# Patient Record
(demographics unavailable — no encounter records)

---

## 2025-07-22 NOTE — HISTORY OF PRESENT ILLNESS
[FreeTextEntry1] : 55 yo female s/p LSH BS here today for annual visit. C/o VMS, wakes up sweating. The patient has no current GYN complaints and denies any vaginal bleeding. No changes to bladder or bowel pattern. The patient denies abdominal pain or bloating.

## 2025-07-22 NOTE — PLAN
[FreeTextEntry1] : Patient to follow up in 1 year for annual GYN exam Estradiol 1mg initiated today for VMS Refill prozac and xanax  Mammogram due:9/2025 Colonoscopy due: yearly per , + hemocue today in office. Hx of hemorrhoids. Informed patient to contact GI for follow up right away. Bone density due: now rx given Pap ordered Hemoccult ordered, All questions answered, patient agreeable with plan  I Andre DOUGLASChrisBC am scribing for the presence of Dr. Kumar the following sections HISTORY OF PRESENT ILLNESS, PAST MEDICAL/FAMILY/SOCIAL HISTORY; REVIEW OF SYSTEMS; VITAL SIGNS; PHYSICAL EXAM; DISPOSITION. I personally performed the services described in the documentation, reviewed the documentation recorded by the scribe in my presence and it accurately and completely records my words and actions.

## 2025-07-22 NOTE — PHYSICAL EXAM
[Appropriately responsive] : appropriately responsive [Alert] : alert [No Acute Distress] : no acute distress [No Lymphadenopathy] : no lymphadenopathy [Soft] : soft [Non-tender] : non-tender [Non-distended] : non-distended [No HSM] : No HSM [No Lesions] : no lesions [No Mass] : no mass [Oriented x3] : oriented x3 [Examination Of The Breasts] : a normal appearance [No Masses] : no breast masses were palpable [Labia Majora] : normal [Labia Minora] : normal [Absent] : absent [Uterine Adnexae] : absent [Normal rectal exam] : was normal [Normal Brown Stool] : was normal and brown [Normal] : was normal [None] : there was no rectal mass  [FreeTextEntry2] : Andre Carter [Occult Blood Positive] : was negative for occult blood analysis [Internal Hemorrhoid] : no internal hemorrhoids were present [External Hemorrhoid] : no external hemorrhoids were present [Skin Tags] : no residual hemorrhoidal skin tags

## 2025-07-22 NOTE — HISTORY OF PRESENT ILLNESS
[FreeTextEntry1] : 53 yo female s/p LSH BS here today for annual visit. C/o VMS, wakes up sweating. The patient has no current GYN complaints and denies any vaginal bleeding. No changes to bladder or bowel pattern. The patient denies abdominal pain or bloating.